# Patient Record
Sex: MALE | URBAN - METROPOLITAN AREA
[De-identification: names, ages, dates, MRNs, and addresses within clinical notes are randomized per-mention and may not be internally consistent; named-entity substitution may affect disease eponyms.]

---

## 2017-12-10 ENCOUNTER — HOSPITAL ENCOUNTER (EMERGENCY)
Age: 37
Discharge: STILL ADMITTED OR ADMINISTRATIVE | End: 2017-12-10
Attending: EMERGENCY MEDICINE

## 2017-12-10 DIAGNOSIS — Z53.20 PATIENT REFUSAL OF TREATMENT: Primary | ICD-10-CM

## 2017-12-10 PROCEDURE — 75810000275 HC EMERGENCY DEPT VISIT NO LEVEL OF CARE

## 2017-12-10 NOTE — ED PROVIDER NOTES
Patient brought to ED by Jeovanny PEDROZA and refused evaluation and treatment. Coding Diagnoses     Clinical Impression:   1. Patient refusal of treatment        Tai Duffy M.D.   [employer] Board Certified Emergency Physician

## 2017-12-10 NOTE — ED PROVIDER NOTES
HPI     No past medical history on file. No past surgical history on file. No family history on file. Social History     Social History    Marital status: N/A     Spouse name: N/A    Number of children: N/A    Years of education: N/A     Occupational History    Not on file. Social History Main Topics    Smoking status: Not on file    Smokeless tobacco: Not on file    Alcohol use Not on file    Drug use: Not on file    Sexual activity: Not on file     Other Topics Concern    Not on file     Social History Narrative         ALLERGIES: Review of patient's allergies indicates not on file. Review of Systems    There were no vitals filed for this visit.          Physical Exam     MDM  ED Course       Procedures